# Patient Record
(demographics unavailable — no encounter records)

---

## 2024-11-14 NOTE — ASSESSMENT
[FreeTextEntry1] : PENILE INJECTION TEST:  ANGELO SKY  11/14/2024   The patient was placed supine on the procedure table.  The left side of the penis was prepped with alcohol, and the patient received 20 mcg @ 1 cc of Alprostadil. The patient tolerated the injection well.  No bleeding occurred at the injection site.     The patient was examined at 5, 10, 30 and 45 minutes interval post injection:   The patients penis was:      cm stretched  Deformity: Narrowing:  An hour glass deformity:  Curvature:  Post Injection Erectile Response: At 5 minutes:      % rigid erection was noted.  At 15 minutes:   % rigidity.   At 30 minutes:     % rigidity.  Spontaneous detumescence occurred after 60 minutes.    The patient was discharged with a soft erection and was advised to call should his erection become more rigid.  Post response evaluation by the patient: The patient described that this erection was better than his sexually induced erections.   The erection was not adequate for vaginal penetration. Impression:         Severe organic erectile dysfunction.  Recommendation:   Insertion of inflatable implant

## 2025-07-21 NOTE — ASSESSMENT
[FreeTextEntry1] : Patient is interested in pursuing treatment for his prolapsing internal hemorrhoids.  I have counseled him regarding the role of medical/conservative management including increasing fiber, normalizing bowel habits.  Patient is not a candidate for rubber band ligation at this time given the need for Plavix for his stenting of his heart.

## 2025-07-21 NOTE — HISTORY OF PRESENT ILLNESS
[FreeTextEntry1] : 81 y/o M presents for initial evaluation  Reason for visit: "Hemorrhoid"  Referred by: Terri Thompson   PMH: CAD, Pre-DM (since lost weight patient, HTN PSH: s/p PCI x 2, on Plavix (4years ago)  Meds: Metformin, Plavix, Statin, Metoprolol  Denies any FMH of CRC/IBD. Father (+) Hemorrhoids, Sibling (+) lung cancer, Sibling (+) PAD, Brother (+) MI   Former cigarettes smoker, quit 40 years ago  Allergies: NKDA C-scope: Dr. Avalos in 2023, normal per pt.   Patient reports felt a perianal bump 6-8 months prior after a BM. Felt area tender. Might have seen BRB on tissue paper but not in toilet bowl.  Seen by his PCP at the time and told it was a hemorrhoid. Trial of OTC and suppositories were recommended.  Tried OTC and suppositories with some improvement Has 1-2 BMs per day, at times stool may be loose.  Not taking fiber supplements, stool softeners.  Denies any rectal pain, rectal bleeding, fever or chills  Took Plavix this morning.

## 2025-07-21 NOTE — PHYSICAL EXAM
[Excoriation] : no perianal excoriation [Skin Tags] : there were no residual hemorrhoidal skin tags seen [Normal] : was normal [None] : there was no rectal mass  [FreeTextEntry1] : Medical assistant was present for the entire exam.  Anoscopy was performed for evaluation of the patients rectal bleeding  history . The risks, benefits and alternatives were reviewed.  A lighted anoscope was passed into the anal canal and the entire anal mucosal surface was inspected..   The findings revealed moderate internal hemorrhoids. No masses or lesions were identified.